# Patient Record
Sex: MALE | Race: WHITE | NOT HISPANIC OR LATINO | Employment: FULL TIME | URBAN - METROPOLITAN AREA
[De-identification: names, ages, dates, MRNs, and addresses within clinical notes are randomized per-mention and may not be internally consistent; named-entity substitution may affect disease eponyms.]

---

## 2024-02-16 ENCOUNTER — OFFICE VISIT (OUTPATIENT)
Dept: UROLOGY | Facility: CLINIC | Age: 69
End: 2024-02-16
Payer: COMMERCIAL

## 2024-02-16 ENCOUNTER — LAB (OUTPATIENT)
Dept: LAB | Facility: LAB | Age: 69
End: 2024-02-16
Payer: COMMERCIAL

## 2024-02-16 DIAGNOSIS — C61 PROSTATE CANCER (MULTI): ICD-10-CM

## 2024-02-16 LAB
POC BILIRUBIN, URINE: NEGATIVE
POC BLOOD, URINE: NEGATIVE
POC GLUCOSE, URINE: NEGATIVE MG/DL
POC KETONES, URINE: NEGATIVE MG/DL
POC LEUKOCYTES, URINE: ABNORMAL
POC NITRITE,URINE: POSITIVE
POC PH, URINE: 5.5 PH
POC PROTEIN, URINE: NEGATIVE MG/DL
POC SPECIFIC GRAVITY, URINE: 1.02
POC UROBILINOGEN, URINE: 0.2 EU/DL
PSA SERPL-MCNC: 10.19 NG/ML

## 2024-02-16 PROCEDURE — 99213 OFFICE O/P EST LOW 20 MIN: CPT | Performed by: UROLOGY

## 2024-02-16 PROCEDURE — 81003 URINALYSIS AUTO W/O SCOPE: CPT | Performed by: UROLOGY

## 2024-02-16 PROCEDURE — 84153 ASSAY OF PSA TOTAL: CPT

## 2024-02-16 PROCEDURE — 1159F MED LIST DOCD IN RCRD: CPT | Performed by: UROLOGY

## 2024-02-16 PROCEDURE — 36415 COLL VENOUS BLD VENIPUNCTURE: CPT

## 2024-02-16 NOTE — PROGRESS NOTES
02/16/2024   voiding well, no weight loss no bone pain     patient has no nausea, no vomiting, no fever.    NASIR: Deferred     PSA:Pending    We discussed [place] 6 prostate cancer, watchful waiting protocol, PSA stable   All the questions were answered, the patient expressed understanding and agreed to the plan.     Impression  [place] 6 prostate cancer  BPH     Plan  Continue Watchful waiting protocol  PSA now, in a year  Appointment in 1 year    Chief Complaint   Patient presents with    Prostate Cancer     Patient here for yearly check up .         Physical Exam   No PSA  No Urine Sample       TODAYS LAB RESULTS:  POC Glucose, Urine  NEGATIVE mg/dl NEGATIVE   POC Bilirubin, Urine  NEGATIVE NEGATIVE   POC Ketones, Urine  NEGATIVE mg/dl NEGATIVE   POC Specific Gravity, Urine  1.005 - 1.035 1.020   POC Blood, Urine  NEGATIVE NEGATIVE   POC PH, Urine  No Reference Range Established PH 5.5   POC Protein, Urine  NEGATIVE, 30 (1+) mg/dl NEGATIVE   POC Urobilinogen, Urine  0.2, 1.0 EU/DL 0.2   Poc Nitrite, Urine  NEGATIVE POSITIVE Abnormal    POC Leukocytes, Urine  NEGATIVE TRACE Abnormal        ASSESSMENT&PLAN:      IMPRESSIONS:     02/17/2023  Voiding well, nocturia x0. No weight loss no bone pain     Patient has no nausea, no vomiting, no fever.     NASIR: Deferred     PSA: Stable     Patient here today for annual follow up BPH/prostate cancer/h/o kidney stones.  Continue watchful waiting.     PSA 2/9/23 7.74  PSA 2/10/22 7.38.     Nocturia and frequency due to fluid intake - he drinks 1 gallon of water QD.     PVR = 16 ml  Becca Orta CMA     Test Result Flag Reference Goal Last Verified    IO Glucose - Urine Negative REQUIRED    IO Bilirubin Negative REQUIRED    IO Ketones Negative REQUIRED    IO Specific Gravity 1.025 REQUIRED    IO Blood Trace REQUIRED    IO pH 5.5 REQUIRED    IO Protein, Urine Negative REQUIRED    IO Urobilinogen Normal (0.2-1.0 mg/dl) REQUIRED    IO Nitrite, Urine Positive REQUIRED    IO  Leukocytes Negative REQUIRED      We discussed [place] 6 prostate cancer, watchful waiting protocol, PSA stable   All the questions were answered, the patient expressed understanding and agreed to the plan.     Impression  [place] 6 prostate cancer  BPH     Plan  Continue Watchful waiting protocol  Yearly NASIR and PSA              02/18/2022  Voiding well, no weight loss no bone pain, no kidney stone pain     Patient has no nausea, no vomiting, no fever.     NASIR: Deferred     2/10/22 7.38  8/16/21 6.27     We discussed [place] 6 prostate cancer, watchful waiting protocol, PSA stable   All the questions were answered, the patient expressed understanding and agreed to the plan.     Impression  [place] 6 prostate cancer  BPH     Plan  Continue Watchful waiting protocol  Yearly NASIR and PSA        02/19/21  Voiding well, no weight loss no bone pain, no poor appetite. Voiding no problem     Patient has no nausea, no vomiting, no fever.     NASIR: Deferred     PSA11/23/20 7.45, 2/10/21, 7.47.â€“stable     We discussed [place] 6 prostate cancer, watchful waiting protocol, PSA stable   All the questions were answered, the patient expressed understanding and agreed to the plan.     Impression  [place] 6 prostate cancer  BPH     Plan  Continue Watchful waiting protocol  PSA in 6 months, 12 months  Appointment in 12 months        08/21/2020  Patient here for post prostate biopsy follow-up. Patient did well, bleeding.     Patient has no nausea, no vomiting, no fever.     prostate biopsy [place] 3+3 equal 6 prostate adenocarcinoma 1/12, 10%     With discussed prostate cancer treatment options, includes #1 watchful waiting, #2 IM RT, #3 brachy therapy, #4 radical prostatectomy, #5 hormonal treatment etc. risk and benefit for each and patient expressed understanding and will make decisions.     Impression  [place] 6 prostate cancer  BPH     Plan  Watchful waiting protocol  PSA in 3 months, 6 months  Appointment in 6 months     I  spent 25 minutes with this patient. Greater than 50% of this time was spent in counseling and/or coordination of care        08/10/2020  Prostate biopsy.     Patient was positioned left side down decubitus position.      10 cc 1% lidocaine was injected locally under ultrasound guidance.     Prostate ultrasound was performed and volume was measured.   then prostate needle biopsy was performed and following specimen was obtained:  Left lateral apex, left lateral mid, left lateral base, left apex, left mid, left base;  Right apex, right mid, right base, right lateral apex, right lateral mid, right lateral base     Patient tolerated the procedure well and with minimal bleeding.     Pain level  0/10 before  2/10 after     We discussed the post biopsy instructions  All the questions were answered, the patient expressed understanding and agreed to the plan.     Impression  Bladder stone, multiple, status post lithotripsy  BPH  Elevated PSA      Plan  Await pathology  Appointment in 2 weeks           06/25/2020  Patient here for elevated PSA follow-up     Patient has no nausea, no vomiting, no fever.        PSA   6/17/20 6.92     We talked about the meaning of PSA elevation, possible prostate cancer, we discussed transrectal ultrasound-guided prostate biopsy. The risk and the benefits were discussed. Patient agreed to proceed a biopsy.     Plan:  Transrectal ultrasound-guided prostate biopsy;  Cipro 500 mg twice a day Ã--3 days started day before biopsy;  Fleets enema used 2 hours before biopsy;  Return to office 2 weeks after biopsy     I spent 25 minutes with this patient. Greater than 50% of this time was spent in counseling and/or coordination of care        05/282020  2 days post holmium laser lithotripsy for bladder stones. The Olivas catheter was removed last night in the emergency room.     Patient voiding well, urine clear     We discussed the post bladder stone home related lithotripsy  We discussed the elevated  PSA, repeat PSA in 1 month, possible prostate biopsy in the future     Impression  Bladder stone, multiple, status post lithotripsy  BPH  Elevated PSA      Plan  Repeat PSA in 1 month  Appointment in 1 month        05/07/2020  Cystoscopy     Patient is still voiding with some difficulty     A cystoscopy was performed under local without difficulty, a 6 mm stone was pushed back into the bladder     Findings: 6 mm stone in the anterior urethra 2 cm from the urethral meatus     Pain evaluation: 0/10 before, 2/10 after.     We discussed cystoscopy result, we discussed the Olivas catheter versus watchful waiting  We discussed the indication of the bladder lithotripsy, side effect and benefit etc.  We discussed elevated PSA, indication workup prostate biopsy etc.  All the questions were answered, the patient expressed understanding and agreed to the plan.     Impression  Bladder stone, multiple  Flank pain  BPH  PSA screening     Plan  Watch voiding  Increase fluids intake  Cystoscopy holmium laser lithotripsy of the bladder stone  Will need prostate biopsy for elevated PSA in the future              03/06/2020  No more passing stone, voiding well     Patient has no nausea, no vomiting, no fever.     CT abdomen and pelvis without contrast  IMPRESSION:  No evidence of nephrolithiasis or hydronephrosis. No signs of  obstructive uropathy.  Numerous urinary bladder calculi.  Prostatomegaly.  Mild hepatic steatosis.  Small hiatal hernia.     IO UA (automated w/o microscopy)           06Mar2020 08:14AM          Pete Yo     Test Name       Result     Flag        Reference  IO Glucose - Urine         100 mg/dl                              IO Bilirubin       Negative                  IO Ketones      Negative                  IO Specific Gravity         1.030                       IO Blood          Negative                  IO pH               6.0                           IO Protein, Urine            Negative                  IO  Urobilinogen                                              Normal (0.2-1.0 mg/dl)  IO Nitrite, Urine              Positive                   IO Leukocytes Negative                  IO Glucose - Urine         100 mg/dl                              IO Bilirubin       Negative                  IO Ketones      Negative                  IO Specific Gravity         1.030                       IO Blood          Negative                  IO pH               6.0                           IO Protein, Urine            Negative                  IO Urobilinogen                                              Normal (0.2-1.0 mg/dl)  IO Nitrite, Urine              Positive                   IO Leukocytes Negative      We discussed CT scan results, numerous bladder stone up to 2 cm, we discussed cystoscopy holmium laser lithotripsy;  We discussed the normal PSA  All the questions were answered, the patient expressed understanding and agreed to the plan.     Impression  Bladder stone, multiple  Flank pain  BPH  PSA screening     Plan  Cystoscopy, holmium laser lithotripsy  2 weeks after     I spent 25 minutes with this patient. Greater than 50% of this time was spent in counseling and/or coordination of care                                               02/14/2020  64-year-old gentleman has passed dozens of stones over last 2 years, asymptomatic right now. Voiding okay, nocturia 1-2     Patient has no nausea, no vomiting, no fever.     Exam: Normal genitalia, normal testes     NASIR: 2+, no nodule     We discussed the kidney stone, passing stone, CT scan and the stone analysis;  We discussed benign prostate hypertrophy with a mild voiding symptoms, PSA screening etc.  All the questions were answered, the patient expressed understanding and agreed to the plan.     Impression  Kidney stone  Flank pain  BPH  PSA screening     Plan  CT abdomen and pelvis without contrast for kidney stone  Stone analysis  PSA  Appointment in 2-3 weeks         PSA  2/9/23 7.74  2/10/22 7.38  8/16/21 6.27  2/10/21, 7.47.  11/23/20 7.45  6/17/20 6.92  2/14/20 7.25.     Surgery  8/10/20 prostate biopsy [place]'s 3+3 equal 6 prostate adenocarcinoma 1/12, 10%  5/26/2020 bladder stone oh milliliter lithotripsy

## 2025-02-17 DIAGNOSIS — C61 PROSTATE CANCER (MULTI): ICD-10-CM

## 2025-02-19 LAB — PSA SERPL-MCNC: 9.78 NG/ML

## 2025-02-21 ENCOUNTER — APPOINTMENT (OUTPATIENT)
Dept: UROLOGY | Facility: CLINIC | Age: 70
End: 2025-02-21
Payer: COMMERCIAL

## 2025-02-21 VITALS
DIASTOLIC BLOOD PRESSURE: 85 MMHG | BODY MASS INDEX: 26.52 KG/M2 | HEART RATE: 70 BPM | WEIGHT: 165 LBS | SYSTOLIC BLOOD PRESSURE: 166 MMHG | HEIGHT: 66 IN

## 2025-02-21 DIAGNOSIS — N40.1 BENIGN PROSTATIC HYPERPLASIA WITH LOWER URINARY TRACT SYMPTOMS, SYMPTOM DETAILS UNSPECIFIED: Primary | ICD-10-CM

## 2025-02-21 DIAGNOSIS — C61 PROSTATE CANCER (MULTI): ICD-10-CM

## 2025-02-21 LAB
POC BILIRUBIN, URINE: NEGATIVE
POC BLOOD, URINE: ABNORMAL
POC GLUCOSE, URINE: NEGATIVE MG/DL
POC KETONES, URINE: NEGATIVE MG/DL
POC LEUKOCYTES, URINE: ABNORMAL
POC NITRITE,URINE: POSITIVE
POC PH, URINE: 6 PH
POC PROTEIN, URINE: NEGATIVE MG/DL
POC SPECIFIC GRAVITY, URINE: 1.02
POC UROBILINOGEN, URINE: 0.2 EU/DL

## 2025-02-21 PROCEDURE — 1159F MED LIST DOCD IN RCRD: CPT | Performed by: UROLOGY

## 2025-02-21 PROCEDURE — 3008F BODY MASS INDEX DOCD: CPT | Performed by: UROLOGY

## 2025-02-21 PROCEDURE — 81003 URINALYSIS AUTO W/O SCOPE: CPT | Performed by: UROLOGY

## 2025-02-21 PROCEDURE — 99213 OFFICE O/P EST LOW 20 MIN: CPT | Performed by: UROLOGY

## 2025-02-21 NOTE — PATIENT INSTRUCTIONS
Impression  [place] 6 prostate cancer  BPH     Plan  Continue Watchful waiting protocol  Yearly NASIR and PSA

## 2025-02-21 NOTE — PROGRESS NOTES
02/21/2025  voiding well, no weight loss no bone pain      patient has no nausea, no vomiting, no fever.     NASIR: Deferred     PSA  2/18/2025 9.78  2/16/2024 10.19  2/9/23 7.74    We discussed [place] 6 prostate cancer, watchful waiting protocol, PSA stable   We discussed benign prostate hypertrophy with a mild voiding symptom  All the questions were answered, the patient expressed understanding and agreed to the plan.     Impression  [place] 6 prostate cancer  BPH     Plan  Continue Watchful waiting protocol  Yearly NASIR and PSA  Chief Complaint   Patient presents with    Prostate Cancer     Pt is here today for a yearly follow up for prostate cancer, elevated PSA and BPH. He denies any voiding issues at this time.        Physical Exam     TODAYS LAB RESULTS:  Lab Results   Component Value Date    PSA 9.78 (H) 02/18/2025    PSA 10.19 (H) 02/16/2024    PSA 7.74 (H) 02/08/2023     ntains abnormal data POCT UA Automated manually resulted  Order: [id number]   Status: Final result       Visible to patient: Yes (not seen)       Next appt: None       Dx: Prostate cancer (Multi)    0 Result Notes       Component  Ref Range & Units 09:45 1 yr ago   POC Glucose, Urine  NEGATIVE mg/dl NEGATIVE NEGATIVE   POC Bilirubin, Urine  NEGATIVE NEGATIVE NEGATIVE   POC Ketones, Urine  NEGATIVE mg/dl NEGATIVE NEGATIVE   POC Specific Gravity, Urine  1.005 - 1.035 1.020 1.020   POC Blood, Urine  NEGATIVE TRACE-Intact Abnormal  NEGATIVE   POC PH, Urine  No Reference Range Established PH 6.0 5.5   POC Protein, Urine  NEGATIVE mg/dl NEGATIVE NEGATIVE R   POC Urobilinogen, Urine  0.2, 1.0 EU/DL 0.2 0.2   Poc Nitrite, Urine  NEGATIVE POSITIVE Abnormal  POSITIVE Abnormal    POC Leukocytes, Urine  NEGATIVE TRACE Abnormal  TRACE Abnormal           ASSESSMENT&PLAN:      IMPRESSIONS:         02/16/2024   voiding well, no weight loss no bone pain      patient has no nausea, no vomiting, no fever.     NASIR: Deferred      PSA:Pending     We discussed  [place] 6 prostate cancer, watchful waiting protocol, PSA stable   All the questions were answered, the patient expressed understanding and agreed to the plan.     Impression  [place] 6 prostate cancer  BPH     Plan  Continue Watchful waiting protocol  PSA now, in a year  Appointment in 1 year          Chief Complaint   Patient presents with    Prostate Cancer       Patient here for yearly check up .          Physical Exam   No PSA  No Urine Sample         TODAYS LAB RESULTS:  POC Glucose, Urine  NEGATIVE mg/dl NEGATIVE   POC Bilirubin, Urine  NEGATIVE NEGATIVE   POC Ketones, Urine  NEGATIVE mg/dl NEGATIVE   POC Specific Gravity, Urine  1.005 - 1.035 1.020   POC Blood, Urine  NEGATIVE NEGATIVE   POC PH, Urine  No Reference Range Established PH 5.5   POC Protein, Urine  NEGATIVE, 30 (1+) mg/dl NEGATIVE   POC Urobilinogen, Urine  0.2, 1.0 EU/DL 0.2   Poc Nitrite, Urine  NEGATIVE POSITIVE Abnormal    POC Leukocytes, Urine  NEGATIVE TRACE Abnormal          ASSESSMENT&PLAN:        IMPRESSIONS:      02/17/2023  Voiding well, nocturia x0. No weight loss no bone pain     Patient has no nausea, no vomiting, no fever.     NASIR: Deferred     PSA: Stable     Patient here today for annual follow up BPH/prostate cancer/h/o kidney stones.  Continue watchful waiting.     PSA 2/9/23 7.74  PSA 2/10/22 7.38.     Nocturia and frequency due to fluid intake - he drinks 1 gallon of water QD.     PVR = 16 ml  Becca Orta CMA     Test Result Flag Reference Goal Last Verified    IO Glucose - Urine Negative REQUIRED    IO Bilirubin Negative REQUIRED    IO Ketones Negative REQUIRED    IO Specific Gravity 1.025 REQUIRED    IO Blood Trace REQUIRED    IO pH 5.5 REQUIRED    IO Protein, Urine Negative REQUIRED    IO Urobilinogen Normal (0.2-1.0 mg/dl) REQUIRED    IO Nitrite, Urine Positive REQUIRED    IO Leukocytes Negative REQUIRED      We discussed [place] 6 prostate cancer, watchful waiting protocol, PSA stable   All the questions were answered,  the patient expressed understanding and agreed to the plan.     Impression  [place] 6 prostate cancer  BPH     Plan  Continue Watchful waiting protocol  Yearly NASIR and PSA              02/18/2022  Voiding well, no weight loss no bone pain, no kidney stone pain     Patient has no nausea, no vomiting, no fever.     NASIR: Deferred     2/10/22 7.38  8/16/21 6.27     We discussed [place] 6 prostate cancer, watchful waiting protocol, PSA stable   All the questions were answered, the patient expressed understanding and agreed to the plan.     Impression  [place] 6 prostate cancer  BPH     Plan  Continue Watchful waiting protocol  Yearly NASIR and PSA        02/19/21  Voiding well, no weight loss no bone pain, no poor appetite. Voiding no problem     Patient has no nausea, no vomiting, no fever.     NASIR: Deferred     PSA11/23/20 7.45, 2/10/21, 7.47.â€“stable     We discussed [place] 6 prostate cancer, watchful waiting protocol, PSA stable   All the questions were answered, the patient expressed understanding and agreed to the plan.     Impression  [place] 6 prostate cancer  BPH     Plan  Continue Watchful waiting protocol  PSA in 6 months, 12 months  Appointment in 12 months        08/21/2020  Patient here for post prostate biopsy follow-up. Patient did well, bleeding.     Patient has no nausea, no vomiting, no fever.     prostate biopsy [place]'s 3+3 equal 6 prostate adenocarcinoma 1/12, 10%     With discussed prostate cancer treatment options, includes #1 watchful waiting, #2 IM RT, #3 brachy therapy, #4 radical prostatectomy, #5 hormonal treatment etc. risk and benefit for each and patient expressed understanding and will make decisions.     Impression  [place] 6 prostate cancer  BPH     Plan  Watchful waiting protocol  PSA in 3 months, 6 months  Appointment in 6 months     I spent 25 minutes with this patient. Greater than 50% of this time was spent in counseling and/or coordination of care        08/10/2020  Prostate  biopsy.     Patient was positioned left side down decubitus position.      10 cc 1% lidocaine was injected locally under ultrasound guidance.     Prostate ultrasound was performed and volume was measured.   then prostate needle biopsy was performed and following specimen was obtained:  Left lateral apex, left lateral mid, left lateral base, left apex, left mid, left base;  Right apex, right mid, right base, right lateral apex, right lateral mid, right lateral base     Patient tolerated the procedure well and with minimal bleeding.     Pain level  0/10 before  2/10 after     We discussed the post biopsy instructions  All the questions were answered, the patient expressed understanding and agreed to the plan.     Impression  Bladder stone, multiple, status post lithotripsy  BPH  Elevated PSA      Plan  Await pathology  Appointment in 2 weeks           06/25/2020  Patient here for elevated PSA follow-up     Patient has no nausea, no vomiting, no fever.        PSA   6/17/20 6.92     We talked about the meaning of PSA elevation, possible prostate cancer, we discussed transrectal ultrasound-guided prostate biopsy. The risk and the benefits were discussed. Patient agreed to proceed a biopsy.     Plan:  Transrectal ultrasound-guided prostate biopsy;  Cipro 500 mg twice a day Ã--3 days started day before biopsy;  Fleets enema used 2 hours before biopsy;  Return to office 2 weeks after biopsy     I spent 25 minutes with this patient. Greater than 50% of this time was spent in counseling and/or coordination of care        05/282020  2 days post holmium laser lithotripsy for bladder stones. The Olivas catheter was removed last night in the emergency room.     Patient voiding well, urine clear     We discussed the post bladder stone home related lithotripsy  We discussed the elevated PSA, repeat PSA in 1 month, possible prostate biopsy in the future     Impression  Bladder stone, multiple, status post lithotripsy  BPH  Elevated  PSA      Plan  Repeat PSA in 1 month  Appointment in 1 month        05/07/2020  Cystoscopy     Patient is still voiding with some difficulty     A cystoscopy was performed under local without difficulty, a 6 mm stone was pushed back into the bladder     Findings: 6 mm stone in the anterior urethra 2 cm from the urethral meatus     Pain evaluation: 0/10 before, 2/10 after.     We discussed cystoscopy result, we discussed the Olivas catheter versus watchful waiting  We discussed the indication of the bladder lithotripsy, side effect and benefit etc.  We discussed elevated PSA, indication workup prostate biopsy etc.  All the questions were answered, the patient expressed understanding and agreed to the plan.     Impression  Bladder stone, multiple  Flank pain  BPH  PSA screening     Plan  Watch voiding  Increase fluids intake  Cystoscopy holmium laser lithotripsy of the bladder stone  Will need prostate biopsy for elevated PSA in the future              03/06/2020  No more passing stone, voiding well     Patient has no nausea, no vomiting, no fever.     CT abdomen and pelvis without contrast  IMPRESSION:  No evidence of nephrolithiasis or hydronephrosis. No signs of  obstructive uropathy.  Numerous urinary bladder calculi.  Prostatomegaly.  Mild hepatic steatosis.  Small hiatal hernia.     IO UA (automated w/o microscopy)           06Mar2020 08:14AM          Pete Yo     Test Name       Result     Flag        Reference  IO Glucose - Urine         100 mg/dl                              IO Bilirubin       Negative                  IO Ketones      Negative                  IO Specific Gravity         1.030                       IO Blood          Negative                  IO pH               6.0                           IO Protein, Urine            Negative                  IO Urobilinogen                                              Normal (0.2-1.0 mg/dl)  IO Nitrite, Urine              Positive                   IO  Leukocytes Negative                  IO Glucose - Urine         100 mg/dl                              IO Bilirubin       Negative                  IO Ketones      Negative                  IO Specific Gravity         1.030                       IO Blood          Negative                  IO pH               6.0                           IO Protein, Urine            Negative                  IO Urobilinogen                                              Normal (0.2-1.0 mg/dl)  IO Nitrite, Urine              Positive                   IO Leukocytes Negative      We discussed CT scan results, numerous bladder stone up to 2 cm, we discussed cystoscopy holmium laser lithotripsy;  We discussed the normal PSA  All the questions were answered, the patient expressed understanding and agreed to the plan.     Impression  Bladder stone, multiple  Flank pain  BPH  PSA screening     Plan  Cystoscopy, holmium laser lithotripsy  2 weeks after     I spent 25 minutes with this patient. Greater than 50% of this time was spent in counseling and/or coordination of care                                               02/14/2020  64-year-old gentleman has passed dozens of stones over last 2 years, asymptomatic right now. Voiding okay, nocturia 1-2     Patient has no nausea, no vomiting, no fever.     Exam: Normal genitalia, normal testes     NASIR: 2+, no nodule     We discussed the kidney stone, passing stone, CT scan and the stone analysis;  We discussed benign prostate hypertrophy with a mild voiding symptoms, PSA screening etc.  All the questions were answered, the patient expressed understanding and agreed to the plan.     Impression  Kidney stone  Flank pain  BPH  PSA screening     Plan  CT abdomen and pelvis without contrast for kidney stone  Stone analysis  PSA  Appointment in 2-3 weeks        PSA  2/18/2025 9.78  2/16/2024 10.19  2/9/23 7.74  2/10/22 7.38  8/16/21 6.27  2/10/21, 7.47.  11/23/20 7.45  6/17/20 6.92  2/14/20 7.25.      Surgery  8/10/20 prostate biopsy [place] 3+3 equal 6 prostate adenocarcinoma 1/12, 10%  5/26/2020 bladder stone oh milliliter lithotripsy

## 2025-05-12 DIAGNOSIS — C61 PROSTATE CANCER (MULTI): ICD-10-CM

## 2026-02-19 ENCOUNTER — APPOINTMENT (OUTPATIENT)
Dept: UROLOGY | Facility: CLINIC | Age: 71
End: 2026-02-19
Payer: COMMERCIAL